# Patient Record
Sex: MALE | Race: WHITE | URBAN - METROPOLITAN AREA
[De-identification: names, ages, dates, MRNs, and addresses within clinical notes are randomized per-mention and may not be internally consistent; named-entity substitution may affect disease eponyms.]

---

## 2017-10-20 ENCOUNTER — PRE VISIT (OUTPATIENT)
Dept: NEUROLOGY | Facility: CLINIC | Age: 32
End: 2017-10-20

## 2017-10-25 NOTE — TELEPHONE ENCOUNTER
Writer tried to reach the pt to complete the previsit but there was no answer, a message was left on their VM requesting a call back to the clinic.  Kenzie Rey RN